# Patient Record
Sex: FEMALE | Race: BLACK OR AFRICAN AMERICAN | NOT HISPANIC OR LATINO | Employment: UNEMPLOYED | URBAN - METROPOLITAN AREA
[De-identification: names, ages, dates, MRNs, and addresses within clinical notes are randomized per-mention and may not be internally consistent; named-entity substitution may affect disease eponyms.]

---

## 2020-01-08 ENCOUNTER — HOSPITAL ENCOUNTER (OUTPATIENT)
Dept: MEDSURG UNIT | Facility: HOSPITAL | Age: 50
End: 2020-01-09
Attending: OBSTETRICS & GYNECOLOGY | Admitting: OBSTETRICS & GYNECOLOGY

## 2020-01-29 LAB
HIGH RISK HPV 16 (PRECISION): NEGATIVE
HIGH RISK HPV 18/45 (PRECISION): NEGATIVE
PAP RECOMMENDATION EXT: NORMAL
PAP SMEAR: NORMAL

## 2022-02-17 ENCOUNTER — HISTORICAL (OUTPATIENT)
Dept: ADMINISTRATIVE | Facility: HOSPITAL | Age: 52
End: 2022-02-17

## 2022-04-07 ENCOUNTER — HISTORICAL (OUTPATIENT)
Dept: ADMINISTRATIVE | Facility: HOSPITAL | Age: 52
End: 2022-04-07

## 2022-04-24 VITALS
BODY MASS INDEX: 43.63 KG/M2 | HEIGHT: 61 IN | SYSTOLIC BLOOD PRESSURE: 136 MMHG | WEIGHT: 231.06 LBS | DIASTOLIC BLOOD PRESSURE: 86 MMHG

## 2022-04-29 NOTE — ED PROVIDER NOTES
Patient:   Donna Levy             MRN: [id number]            FIN: [id number]               Age:   49 years     Sex:  Female     :  1970   Associated Diagnoses:   Anemia requiring transfusions; Menorrhagia; Fibroid uterus   Author:   Rosalva CLINTON, Damon Hanson      Basic Information   Additional information: Chief Complaint from Nursing Triage Note : Chief Complaint   2020 10:36 CST       Chief Complaint           PT W CO HEAVY VAG BLEEDING W LG CLOTS SINCE LAST PM. .  PT SATURATED CLOTHING. CO LOWER ABD CRAMPS AND HX OF FIBROIDS.  .      History of Present Illness   The patient presents Patient is a 48yo female who reports vaginal bleeding started last night with increased bleeding this morning. Patient reports she placed three peripads prior to arrival that are currently soaked. She additionally reports crampy pressure like pain in her pelvis that has been persistent since this morning. .  The onset was 1  days ago.  The course/duration of symptoms is worsening.  The degree of symptoms is moderate.  The relieving factor is none.  Risk factors consist of h/o menorrhagia.        Review of Systems   Constitutional symptoms:  No fever, no chills, no sweats, no weakness, no fatigue.    Skin symptoms:  No rash, no lesion.    Respiratory symptoms:  No shortness of breath, no cough, no hemoptysis.    Cardiovascular symptoms:  Negative except as documented in HPI.   Gastrointestinal symptoms:  Negative except as documented in HPI.   Genitourinary symptoms:  Negative except as documented in HPI.   Musculoskeletal symptoms:  No back pain,    Neurologic symptoms:  No headache, no dizziness, no altered level of consciousness, no numbness, no tingling, no weakness.              Additional review of systems information: All other systems reviewed and otherwise negative.      Health Status   Allergies:    Allergic Reactions (Selected)  No Known Medication Allergies,    Allergies (1) Active Reaction  No Known  Medication Allergies None Documented  .      Past Medical/ Family/ Social History   Medical history:    No active or resolved past medical history items have been selected or recorded..   Surgical history:    No active procedure history items have been selected or recorded..   Family history:    No family history items have been selected or recorded..   Social history:    Social & Psychosocial Habits    Tobacco  01/08/2020  Use: Never (less than 100 in l    Patient Wants Consult For Cessation Counseling N/A    Abuse/Neglect  01/08/2020  SHX Any signs of abuse or neglect No  .   Problem list:    Active Problems (1)  Morbid obesity   .      Physical Examination               Vital Signs   Vital Signs   1/8/2020 10:36 CST       Temperature Oral          36.9 DegC                             Temperature Oral (calculated)             98.42 DegF                             Peripheral Pulse Rate     104 bpm  HI                             Respiratory Rate          18 br/min                             SpO2                      100 %                             Oxygen Therapy            Room air                             Systolic Blood Pressure   172 mmHg  HI                             Diastolic Blood Pressure  96 mmHg  HI  .      Vital Signs (last 24 hrs)_____  Last Charted___________  Temp Oral     36.9 DegC  (JAN 08 10:36)  Heart Rate Peripheral   H 104bpm  (SIRENA 08 10:36)  Resp Rate         18 br/min  (SIRENA 08 10:36)  SBP      H 172mmHg  (SIRENA 08 10:36)  DBP      H 96mmHg  (SIRENA 08 10:36)  SpO2      100 %  (SIRENA 08 10:36)  Weight      107 kg  (SIRENA 08 10:36)  Height      152 cm  (SIRENA 08 10:36)  BMI      46.31  (SIRENA 08 10:36)  .               Per nurse's notes.   Measurements   1/8/2020 10:36 CST       Weight Dosing             107 kg                             Weight Measured           107 kg                             Weight Measured and Calculated in Lbs     235.89 lb                             Height/Length Dosing       152 cm                             Height/Length Measured    152 cm                             Body Mass Index Measured  46.31 kg/m2  .   Basic Oxygen Information   1/8/2020 10:36 CST       SpO2                      100 %                             Oxygen Therapy            Room air  .   General:  No acute distress.   Skin:  Warm, dry, no rash.    Neck:  Supple.   Cardiovascular:  Regular rate and rhythm, No murmur, Normal peripheral perfusion, No edema.    Respiratory:  Breath sounds are equal, Symmetrical chest wall expansion, Respirations: Regular, Breath sounds: Clear.    Chest wall:  No tenderness, No deformity.    Back:  Nontender, Normal range of motion.    Musculoskeletal:  No swelling, no deformity.    Gastrointestinal:  Soft, Nontender, Non distended, Normal bowel sounds.    Genitourinary:  No tenderness, no discharge, normal external genitalia, no lesions, 30-50 ml of blood in the vaginal vault that quickly reaccumulated after being swabbed dry.    Neurological:  Alert and oriented to person, place, time, and situation, No focal neurological deficit observed, CN II-XII intact, normal sensory observed, normal motor observed, normal speech observed, normal coordination observed.    Psychiatric:  Cooperative, appropriate mood & affect.       Medical Decision Making   Differential Diagnosis:  Dysfunctional uterine bleeding, fibroids, malignancy.   Results review:  Lab results : Lab View   1/8/2020 15:40 CST       UA Appear                 Clear                             UA Color                  Yellow                             UA Spec Grav              1.015                             UA Bili                   Negative                             UA pH                     5.5                             UA Urobilinogen           Normal                             UA Blood                  0.2                             UA Glucose                Negative                             UA Ketones                 Negative                             UA Protein                Negative                             UA Nitrite                Negative                             UA Leuk Est               Negative                             UA WBC Interp             0-2 /HPF                             UA RBC Interp             11-25 /HPF                             UA Mucous                 Slight                             UA Bact Interp            None Seen /HPF                             UA Squam Epi Interp       2-20 /LPF                             UA Hyal Cast Interp       0-2 /LPF    1/8/2020 11:00 CST       Sodium Lvl                139 mmol/L                             Potassium Lvl             3.8 mmol/L                             Chloride                  107 mmol/L                             CO2                       25 mmol/L                             Calcium Lvl               8.4 mg/dL  LOW                             Glucose Lvl               103 mg/dL                             BUN                       11 mg/dL                             Creatinine                0.90 mg/dL                             eGFR-AA                   86 mL/min  LOW                             eGFR-MARTA                  71 mL/min  LOW                             Bili Total                0.2 mg/dL                             Bili Direct               <0.1 mg/dL                             Bili Indirect             Calc. not valid mg/dL                             AST                       13 unit/L  LOW                             ALT                       14 unit/L                             Alk Phos                  74 unit/L                             Total Protein             9.2 gm/dL  HI                             Albumin Lvl               3.6 gm/dL                             Globulin                  5.60 gm/mL  HI                             A/G Ratio                 0.6 ratio  LOW                              Beta hCG Ql               Negative                             PT                        12.9 second(s)                             INR                       0.98                             PTT                       28.2 second(s)                             WBC                       5.0 x10(3)/mcL                             RBC                       4.17 x10(6)/mcL                             Hgb                       8.0 gm/dL  LOW                             Hct                       28.1 %  LOW                             Platelet                  356 x10(3)/mcL                             MCV                       67.4 fL  LOW                             MCH                       19.2 pg  LOW                             MCHC                      28.5 gm/dL  LOW                             RDW                       18.2 %  HI                             MPV                       10.9 fL  HI                             Abs Neut                  3.14 x10(3)/mcL                             Neutro Auto               64 %  NA                             Lymph Auto                25 %  NA                             Mono Auto                 8 %  NA                             Eos Auto                  2 %  NA                             Abs Eos                   0.1 x10(3)/mcL                             Basophil Auto             1 %  NA                             Abs Neutro                3.14 x10(3)/mcL                             Abs Lymph                 1.2 x10(3)/mcL                             Abs Mono                  0.4 x10(3)/mcL                             Abs Baso                  0.0 x10(3)/mcL                             IG%                       1 %  NA                             IG#                       0.0400  NA                             Hypochrom                 2+                             Platelet Est              Adequate                             Anisocyte                 2+                              Macrocyte                 1+                             Polychrom                 1+                             RBC Morph                 Abnormal                             Schistocyte               1+                             Target Cell               1+                             ABORh Auto Intp           O NEG                             ABSC Auto Intrp           Neg                             Crossmatch                Compatible                             Crossmatch                Compatible                             Product Ready             2 UNITS READY  .   Radiology results:  Rad Results (ST)  < 12 hrs   * Final Report *    Reason For Exam  Pelvic Pain    Radiology Report  US Pelvic Non-OB w Transvag if needed     REASON FOR STUDY: Pelvic Pain     TECHNIQUE: Transabdominal and endovaginal ultrasound of the pelvis.      COMPARISON: None      FINDINGS:      The uterus is enlarged and heterogeneous with multiple fibroids  identified. There is a 4.9 x 5 x 4.8 cm fibroid noted in the uterine  fundus. There is a 5.9 x 5.8 x 7.2 cm fibroid noted in the right  uterus.     The endometrium is indistinct and not well visualized.     The ovaries are not visualized.     There is no free fluid or adnexal mass identified.     IMPRESSION:   1.  Enlarged heterogeneous uterus with multiple fibroids.  2.  Indistinct endometrium which could be due to fibroids or  adenomyosis. GYN consultation may be beneficial.       Signature Line  Electronically Signed By: Tatyana Crockett MD  Date/Time Signed: 01/08/2020 14:08      This document has an image         Result type: US Pelvic Non-OB w Transvag if needed  Result date: January 08, 2020 13:53 CST  Result status: Auth (Verified)  Result title: US Pelvic Non-OB w Transvag if needed  Performed by: Tatyana Crockett MD on January 08, 2020 14:00 CST  Verified by: Tatyana Crockett MD on January 08, 2020 14:08 CST  Encounter info: [id number],  [facility], Observation, 1/8/2020 -        .      Reexamination/ Reevaluation   Vital signs   results included from flowsheet : Vital Signs   1/8/2020 11:05 CST       Oxygen Therapy            Room air    1/8/2020 10:36 CST       Temperature Oral          36.9 DegC                             Temperature Oral (calculated)             98.42 DegF                             Peripheral Pulse Rate     104 bpm  HI                             Respiratory Rate          18 br/min                             SpO2                      100 %                             Oxygen Therapy            Room air                             Systolic Blood Pressure   172 mmHg  HI                             Diastolic Blood Pressure  96 mmHg  HI     Basic Oxygen Information   1/8/2020 10:36 CST       SpO2                      100 %                             Oxygen Therapy            Room air     Interventions: Order Profile (Selected)   Inpatient Orders  Ordered  Normal Saline Infusion 1,000 mL: 1,000 mL, 1,000 mL, IV, 1,000 mL/hr, start date 01/08/20 10:47:00 CST, 2, m2  Completed  Toradol 30 mg for IV Push: 30 mg, form: Injection, IV Push, Once, first dose 01/08/20 10:47:00 CST, stop date 01/08/20 10:47:00 CST, STAT.   Notes:     Patient seen and examined. Labs and imaging reviewed as above. Heavy bleeding likely related to fibroids. Due to rate of bleeding, GYN was consulted.   On re-evaluation, patient reported pain had improved. Patient will be admitted to GYN service for transfusion.  Patient remained stable during ED evaluation.  .      Procedure   Critical care note   Total time: 30 minutes spent engaged in work directly related to patient care and/ or available for direct patient care.   Critical condition(s) addressed for impending deterioration include: cardiovascular.   Performed by: self.      Impression and Plan   Diagnosis   Anemia requiring transfusions (HVN74-JV D64.9)   Menorrhagia (SYF13-GJ N92.0)   Fibroid  uterus (IDX95-CN D25.9)      Calls-Consults   -  Alicia CLINTON, May S, consult.    Plan   Condition: Guarded.    Disposition: Admit.    Counseled: Patient, Regarding diagnosis, Regarding diagnostic results, Regarding treatment plan, Regarding prescription.

## 2022-05-02 NOTE — HISTORICAL [facility] CERNER
This is a historical note converted from [employer]. Formatting and pictures may have been removed.  Please reference [employer] for original formatting and attached multimedia. Chief Complaint  PT W CO HEAVY VAG BLEEDING W LG CLOTS SINCE LAST PM. . ?PT SATURATED CLOTHING. CO LOWER ABD CRAMPS AND HX OF FIBROIDS.  History of Present Illness  49 year old  who presents with c/o heavy vaginal bleeding and known history?of fibroid uterus.  Patient seen in US suite. She reports she has known about her fibroids for the past 1.5 years when she developed and acute onset pelvic pain and presented to [facility]. Since that ED visit she has not seen a GYN. She has intermittent pelvic and heavy bleeding with every other period or so. Today she reports she started bleeding for days ago and today began bleeding like a stuck pig soaking through her clothes. She has also been experiencing SOB and activity intolerance.  ?  OBGYN History   -CD x2, SAB x1  History of regular cyclic menses. Increasingly heavy in the past year  Denies history of abnormal paps. Last pap > 5 years ago  Denies history of STIs  Review of Systems  Constitutional: No fever, No chills.  Respiratory: SOB with exertion  Cardiovascular: No chest pain, No syncope. No Palpitations  Gastrointestinal: No nausea, No vomiting, No diarrhea, No constipation.  Genitourinary: No dysuria, No hematuria, No abnormal discharge. Heavy vaginal bleeding.  Neurologic: Alert and oriented X4  Physical Exam  Vitals & Measurements  T:?36.9? ?C (Oral)? HR:?106(Peripheral)? RR:?16? BP:?177/110? SpO2:?99%? WT:?107?kg? BMI:?46.31?  General: NAD, A/Ox3. Well appearing.?  Respiratory: No increased work of breathing ?  Cardiovascular: Tachycardia, RR  Abdomen: soft, nondistended, nontender to palpation, obese  Extremities: no edema, no calf tenderness  External genitalia: Normal female anatomy, no masses/lesions. Normal appearing external anus.  Bimanual exam: Vagina with adequate?capacity.  Uterus 16 cm in size, no cervical motion tenderness. Irregular?in contour, Minimal descent, broad. No adnexal fullness/tenderness.?Active mamie bleeding  Assessment/Plan  1.?Symptomatic anemia?D64.9  2.?Vaginal bleeding?[id number]  -Admit to?inpatient for symptomatic anemia secondary to AUB-L  -Provera 20 TID  -Transfuse 2u pRBCs  -Pad Counts  -TVUS in process. Seen in US suit with preliminary report of 14cm fibroid uterus.  -Repeat CBC post transfusion  3.?Morbid obesity?E66.01  4.?Hypertension?I10  -Patient not aware of HTN history and on no meds  -/10  -Will intiate on amlodipine   Problem List/Past Medical History  Ongoing  Morbid obesity  Historical  No qualifying data  ?? Denies diabetes, HTN, asthma,  Procedure/Surgical History  Prior  x2  Medications  Inpatient  Normal Saline Infusion 1,000 mL, 1000 mL, IV  Home  No active home medications  Allergies  No Known Medication Allergies  Social History  Abuse/Neglect  No, 2020  Tobacco  Never (less than 100 in lifetime), N/A, 2020  Family History  Denies history of ovarian, uterine, breasts cancer  Lab Results  Test Name Test Result Date/Time   Hgb 8.0 gm/dL (Low) 2020 11:00 CST   Diagnostic Results  TVUS in process      I personally saw her and discussed transfusion along with risks.? I also reviewed possible causes of AUB, in addition to her medical history as above. She endorses PICA, has been very fatigued at work.  Here with male partner whom she identifies as her boyfriend.? I reviewed all associated labs, and started her on Amlodipine due to acute HTN that has not been treated since she has not seen a physician in >5 years.  She needs a pap, more extensive pelvic exam and endometrial biopsy-- will schedule her for visit within 2 weeks.? She is aware of this, and how to contact us.  ?

## 2022-05-02 NOTE — HISTORICAL [facility] CERNER
This is a historical note converted from [employer]. Formatting and pictures may have been removed.  Please reference Cerravin for original formatting and attached multimedia. Admit and Discharge Dates  Admit Date: 2020  Discharge Date: 2020  Physicians  Attending Physician - Alicia CLINTON, May S  Admitting Physician - Alicia CLINTON, May S  Consulting Physician - Alicia CLINTON, May S  Primary Care Physician - No PCP, No  Discharge Diagnosis  1.?Symptomatic Anemia?D62  ?  2.?Vaginal bleeding?[id number]  ?  3.?Morbid obesity?E66.01  ?  4.?Hypertension?I10  ?  Surgical Procedures  No procedures recorded for this visit.  Admission Information  49 year old  who presents with c/o heavy vaginal bleeding and known history?of fibroid uterus.  Hospital Course  49 year old  who presents with c/o heavy vaginal bleeding and known history?of fibroid uterus. She presented with a hgb of 8 SOB, lightheadedness, and activity intolerance. She underwent TVUS showed enlarged fibroid uterus and was admitted and transfused 2 units of pRBCs. She was started on Provera for acute bleeding control. While in house she was counseled on options for long term management and desires definitive surgical management at this time. She was noted to be hypertensive and started on Amlodipine 5mg QD. On HD#1 s/p transfusion her symptoms resolved. Her Hgb was 9.5. She had a pad count of 1 overnight and reported subjectively improved bleeding. She was discharged with plans for close follow up in [facility] to establish well woman care, obtain EMB, and for presurgical planning.  Significant Findings  ?  Radiology Report  US Pelvic Non-OB w Transvag if needed  ?  REASON FOR STUDY: Pelvic Pain  ?  TECHNIQUE: Transabdominal and endovaginal ultrasound of the pelvis.?  ?  COMPARISON: None?  ?  FINDINGS:?  ?  The uterus is enlarged and heterogeneous with multiple fibroids  identified. There is a 4.9 x 5 x 4.8 cm fibroid  noted in the uterine  fundus. There is a 5.9 x 5.8 x 7.2 cm fibroid noted in the right  uterus.  ?  The endometrium is indistinct and not well visualized.  ?  The ovaries are not visualized.  ?  There is no free fluid or adnexal mass identified.  ?  IMPRESSION:?  1. ?Enlarged heterogeneous uterus with multiple fibroids.  2. ?Indistinct endometrium which could be due to fibroids or adenomyosis. GYN consultation may be beneficial.  ?   H/H 8/28.1 --> 2u pRBCs-->?9.5/31.1  Objective  Vitals & Measurements  T:?36.7? ?C (Oral)? TMIN:?36.4? ?C (Oral)? TMAX:?37.0? ?C (Oral)? HR:?79(Monitored)? RR:?17? BP:?164/88? BP:?192/112(Sitting)? BP:?190/117(Standing)? BP:?189/102(Supine)? SpO2:?100%? WT:?107?kg? BMI:?46.31?  Physical Exam  General: NAD, A/Ox3. Well appearing.?  Respiratory: CTAB  Cardiovascular: RRR  Abdomen: soft, nondistended, nontender to palpation, obese  Extremities: no edema, no calf tenderness  External genitalia: Normal female anatomy, Peripad with minimal vaginal bleeding  Patient Discharge Condition  Stable and improved  Discharge Disposition  Discharged home with close follow up in [facility]   Discharge Medication Reconciliation  Prescribed  amLODIPine (Norvasc 5 mg oral tablet)?5 mg, Oral, Daily  medroxyPROGESTERone (Provera 10 mg oral tablet)?20 mg, Oral, BID  Education and Orders Provided  Anemia  Hypertension  Exercising to Lose Weight  Follow up  Follow up with OB/GYN, within 7 to 10 days  ????  Office will call w/follow up appointment      Rx Provera 10mg PO TID x 10 days, Then Provera 10 PO Daily

## 2023-06-08 ENCOUNTER — DOCUMENTATION ONLY (OUTPATIENT)
Dept: ADMINISTRATIVE | Facility: HOSPITAL | Age: 53
End: 2023-06-08

## 2023-06-27 ENCOUNTER — TELEPHONE (OUTPATIENT)
Dept: GYNECOLOGY | Facility: CLINIC | Age: 53
End: 2023-06-27

## 2023-06-27 NOTE — TELEPHONE ENCOUNTER
----- Message from Wendy Murdock sent at 6/26/2023 10:45 AM CDT -----  Patient requesting a IUD follow up.  Patient wants appt to remove IUD, we put it in 2020.  August 4th appt given

## 2023-06-27 NOTE — TELEPHONE ENCOUNTER
----- Message from Wendy Murdock sent at 6/26/2023 10:45 AM CDT -----  Patient requesting a IUD follow up.